# Patient Record
Sex: FEMALE | Race: WHITE | HISPANIC OR LATINO | URBAN - METROPOLITAN AREA
[De-identification: names, ages, dates, MRNs, and addresses within clinical notes are randomized per-mention and may not be internally consistent; named-entity substitution may affect disease eponyms.]

---

## 2019-09-20 ENCOUNTER — APPOINTMENT (RX ONLY)
Dept: URBAN - METROPOLITAN AREA CLINIC 15 | Facility: CLINIC | Age: 39
Setting detail: DERMATOLOGY
End: 2019-09-20

## 2019-09-20 DIAGNOSIS — Z41.9 ENCOUNTER FOR PROCEDURE FOR PURPOSES OTHER THAN REMEDYING HEALTH STATE, UNSPECIFIED: ICD-10-CM

## 2019-09-20 PROCEDURE — ? RESTYLANE LYFT INJECTION

## 2019-09-20 PROCEDURE — ? BOTOX

## 2019-09-20 PROCEDURE — ? RESTYLANE REFYNE INJECTION

## 2019-09-20 PROCEDURE — ? MEDICAL CONSULTATION: DYNAMIC RHYTIDES

## 2019-09-20 PROCEDURE — ? MEDICAL CONSULTATION: FILLERS

## 2019-09-20 PROCEDURE — ? RESTYLANE DEFYNE INJECTION

## 2019-09-20 ASSESSMENT — LOCATION DETAILED DESCRIPTION DERM
LOCATION DETAILED: GLABELLA
LOCATION DETAILED: RIGHT SUPERIOR LATERAL MALAR CHEEK
LOCATION DETAILED: LEFT INFERIOR CENTRAL MALAR CHEEK
LOCATION DETAILED: LEFT CENTRAL BUCCAL CHEEK
LOCATION DETAILED: RIGHT MEDIAL FOREHEAD
LOCATION DETAILED: RIGHT INFERIOR CENTRAL MALAR CHEEK
LOCATION DETAILED: LEFT INFERIOR VERMILION LIP
LOCATION DETAILED: RIGHT CENTRAL MALAR CHEEK
LOCATION DETAILED: RIGHT CHIN
LOCATION DETAILED: LEFT SUPERIOR CENTRAL MALAR CHEEK
LOCATION DETAILED: RIGHT CENTRAL BUCCAL CHEEK

## 2019-09-20 ASSESSMENT — LOCATION SIMPLE DESCRIPTION DERM
LOCATION SIMPLE: CHIN
LOCATION SIMPLE: GLABELLA
LOCATION SIMPLE: RIGHT FOREHEAD
LOCATION SIMPLE: LEFT LIP
LOCATION SIMPLE: LEFT CHEEK
LOCATION SIMPLE: RIGHT CHEEK

## 2019-09-20 ASSESSMENT — LOCATION ZONE DERM
LOCATION ZONE: FACE
LOCATION ZONE: LIP

## 2019-09-20 NOTE — PROCEDURE: RESTYLANE DEFYNE INJECTION
Include Cannula Information In Note?: No
Jawline Filler Volume In Cc: 0
Detail Level: Detailed
Expiration Date (Month Year): 2020-11-30
Consent: Written consent obtained. Risks include but not limited to bruising, beading, irregular texture, ulceration, infection, allergic reaction, scar formation, incomplete augmentation, temporary nature, procedural pain.
Anesthesia Volume In Cc: 0.5
Filler: Restylane Defyne
Additional Area 1 Location: lips
Post-Care Instructions: Patient instructed to apply ice to reduce swelling.
Anesthesia Type: 1% lidocaine with epinephrine
Map Statement: See [address] for Complete Details
Cheeks Filler Volume In Cc: 2
Procedural Text: The filler was administered to the treatment areas noted above.
Lot #: [id number]
Additional Anesthesia Volume In Cc: 6

## 2019-09-20 NOTE — PROCEDURE: RESTYLANE LYFT INJECTION
Marionette Lines Filler  Volume In Cc: 0
Lot #: [id number]
Use Map Statement For Sites (Optional): No
Additional Area 1 Volume In Cc: 1
Additional Anesthesia Volume In Cc: 6
Procedural Text: The filler was administered to the treatment areas noted above.
Additional Area 2 Location: nose
Consent: Written consent obtained. Risks include but not limited to bruising, beading, irregular texture, ulceration, infection, allergic reaction, scar formation, incomplete augmentation, temporary nature, procedural pain.
Cheeks Filler Volume In Cc: 2
Anesthesia Volume In Cc: 0.5
Post-Care Instructions: Patient instructed to apply ice to reduce swelling.
Expiration Date (Month Year): 2022-02-28
Map Statement: See [address] for Complete Details
Detail Level: Detailed
Filler: Govind Sow
Additional Area 1 Location: Chin Augmentation
Anesthesia Type: 1% lidocaine with epinephrine

## 2019-09-20 NOTE — PROCEDURE: RESTYLANE REFYNE INJECTION
Detail Level: Detailed
Use Map Statement For Sites (Optional): No
Map Statement: See [address] for Complete Details
Anesthesia Type: 1% lidocaine with epinephrine
Anesthesia Volume In Cc: 0.5
Additional Anesthesia Volume In Cc: 6
Filler: Restylane Refyne
Lateral Face Filler  Volume In Cc: 0
Additional Area 1 Location: lips
Additional Area 1 Volume In Cc: 1
Lot #: [id number]
Expiration Date (Month Year): 2020-08-31
Procedural Text: The filler was administered to the treatment areas noted above.
Consent: Written consent obtained. Risks include but not limited to bruising, beading, irregular texture, ulceration, infection, allergic reaction, scar formation, incomplete augmentation, temporary nature, procedural pain.
Post-Care Instructions: Patient instructed to apply ice to reduce swelling.

## 2019-09-20 NOTE — PROCEDURE: BOTOX
Additional Area 4 Units: 0
Lot #: [id number]
Expiration Date (Month Year): 02/2022
Consent: Written consent obtained. Risks include but not limited to lid/brow ptosis, bruising, swelling, diplopia, temporary effect, incomplete chemical denervation.
Post-Care Instructions: Patient instructed to not lie down for 4 hours and limit physical activity for 24 hours. Patient instructed not to travel by airplane for 48 hours.
Periorbital Skin Units: 12
Dilution (U/0.1 Cc): 1
Additional Area 1 Location: [facility]
Glabellar Complex Units: [address] [facility]
Detail Level: Detailed
Forehead Units: 10

## 2019-10-11 ENCOUNTER — APPOINTMENT (RX ONLY)
Dept: URBAN - METROPOLITAN AREA CLINIC 15 | Facility: CLINIC | Age: 39
Setting detail: DERMATOLOGY
End: 2019-10-11

## 2019-10-11 DIAGNOSIS — L81.1 CHLOASMA: ICD-10-CM

## 2019-10-11 DIAGNOSIS — L71.8 OTHER ROSACEA: ICD-10-CM

## 2019-10-11 DIAGNOSIS — Z41.9 ENCOUNTER FOR PROCEDURE FOR PURPOSES OTHER THAN REMEDYING HEALTH STATE, UNSPECIFIED: ICD-10-CM

## 2019-10-11 PROCEDURE — ? BOTOX

## 2019-10-11 PROCEDURE — ? COUNSELING

## 2019-10-11 PROCEDURE — ? ADDITIONAL NOTES

## 2019-10-11 PROCEDURE — ? TREATMENT REGIMEN

## 2019-10-11 PROCEDURE — 99213 OFFICE O/P EST LOW 20 MIN: CPT | Mod: NC

## 2019-10-11 ASSESSMENT — LOCATION SIMPLE DESCRIPTION DERM: LOCATION SIMPLE: LEFT EYEBROW

## 2019-10-11 ASSESSMENT — LOCATION ZONE DERM: LOCATION ZONE: FACE

## 2019-10-11 ASSESSMENT — LOCATION DETAILED DESCRIPTION DERM
LOCATION DETAILED: LEFT CENTRAL EYEBROW
LOCATION DETAILED: LEFT LATERAL EYEBROW

## 2019-10-11 NOTE — PROCEDURE: TREATMENT REGIMEN
Detail Level: Zone
Initiate Treatment: Wash face with gentle cleanser in the morning \\nFinacea foam thin layer to face\\nMetacel renew b3 in the morning \\nStrict sunscreen in the morning ( Alastin)\\n\\nWash face with gentle cleanser at night \\nMetacel renew b3 at night \\nAlphaRet thin layer to face at night ( Avoid corners of the mouth, nose and eyes)\\nSoolantra cream thin layer at night Samples given
Continue Regimen: Wash face with gentle cleanser in the morning \\nFinacea foam thin layer to face\\nBeta self renew b3 in the morning \\nStrict sunscreen in the morning ( Alastin)\\n\\nWash face with gentle cleanser at night \\nBeta self renew b3 at night \\nAlphaRet thin layer to face at night ( Avoid corners of the mouth, nose and eyes)\\nSoolantra cream thin layer at night Samples given

## 2019-10-11 NOTE — PROCEDURE: BOTOX
Left Periorbital Units: 0
Lot #: [id number]
Show Additional Area 1: Yes
Consent: Written consent obtained. Risks include but not limited to lid/brow ptosis, bruising, swelling, diplopia, temporary effect, incomplete chemical denervation.
Show Lcl Units: No
Detail Level: Detailed
Expiration Date (Month Year): 02/2022
Additional Area 3 Location: Superior lateral orbicularis oculi
Dilution (U/0.1 Cc): 1
Additional Area 2 Location: nose 2 units
Additional Area 3 Units: 2
Post-Care Instructions: Patient instructed to not lie down for 4 hours and limit physical activity for 24 hours. Patient instructed not to travel by airplane for 48 hours.
Additional Area 1 Location: [facility]

## 2019-10-11 NOTE — HPI: SKIN LESION
How Severe Is Your Skin Lesion?: mild
Has Your Skin Lesion Been Treated?: not been treated
Is This A New Presentation, Or A Follow-Up?: Yumiko Levy

## 2020-06-18 ENCOUNTER — APPOINTMENT (RX ONLY)
Dept: URBAN - METROPOLITAN AREA CLINIC 15 | Facility: CLINIC | Age: 40
Setting detail: DERMATOLOGY
End: 2020-06-18

## 2020-06-18 DIAGNOSIS — L81.0 POSTINFLAMMATORY HYPERPIGMENTATION: ICD-10-CM

## 2020-06-18 PROBLEM — Z92.89 PERSONAL HISTORY OF OTHER MEDICAL TREATMENT: Status: ACTIVE | Noted: 2020-06-18

## 2020-06-18 PROCEDURE — ? ADDITIONAL NOTES

## 2020-06-18 PROCEDURE — 99213 OFFICE O/P EST LOW 20 MIN: CPT | Mod: GT

## 2020-06-18 PROCEDURE — ? TELEHEALTH ASSESSMENT

## 2020-06-18 PROCEDURE — ? COUNSELING

## 2020-06-18 NOTE — PROCEDURE: TELEHEALTH ASSESSMENT

## 2020-06-18 NOTE — PROCEDURE: ADDITIONAL NOTES
Detail Level: Zone
Additional Notes: .\\n\\nAM\\n\\n- Cleanse face \\n\\n- Skin Better Even tone ( in office product) \\n\\n- Skin Medica TNS Serum + (purchased online)\\n\\n- SPF \\n\\nPM\\n\\n- cleanse face \\n\\n- Skin Better Even tone ( in office product) \\n\\n- Skin Better Intensive Alpha ret ( in office product) - advised to discontinue if she becomes pregnant\\n\\n* patient prefers to d/c HQ 4%

## 2020-09-29 ENCOUNTER — APPOINTMENT (RX ONLY)
Dept: URBAN - METROPOLITAN AREA CLINIC 15 | Facility: CLINIC | Age: 40
Setting detail: DERMATOLOGY
End: 2020-09-29

## 2020-09-29 DIAGNOSIS — Z41.9 ENCOUNTER FOR PROCEDURE FOR PURPOSES OTHER THAN REMEDYING HEALTH STATE, UNSPECIFIED: ICD-10-CM

## 2020-09-29 PROCEDURE — ? BOTOX

## 2020-09-29 PROCEDURE — ? FILLERS

## 2020-09-29 NOTE — PROCEDURE: BOTOX
Mentalis Units: 0
Show Additional Area 4: Yes
Expiration Date (Month Year): 04/23
Additional Area 3 Location: chin
Detail Level: Zone
Show Right And Left Brow Units: No
Post-Care Instructions: Patient instructed to not lie down for 4 hours and limit physical activity for 24 hours. Patient instructed not to travel by airplane for 48 hours.
Additional Area 2 Location: under eyes
Dilution (U/0.1 Cc): 1
Forehead Units: 15
Price (Use Numbers Only, No Special Characters Or $): [address] [facility]
Additional Area 5 Location: lips
Additional Area 1 Location: tail of eyebrow
Lot #: [id number]
Glabellar Complex Units: [address] [facility]
Additional Area 4 Location: neck
Consent: Written consent obtained. Risks include but not limited to lid/brow ptosis, bruising, swelling, diplopia, temporary effect, incomplete chemical denervation.

## 2020-09-29 NOTE — PROCEDURE: FILLERS
Additional Area 3 Volume In Cc: 0
Map Statment: See [address] for Complete Details
Additional Area 4 Location: neck lines
Cheeks Filler Volume In Cc: 1.5
Filler Comments: Hyper diluted Radiesse 1:1
Include Cannula Information In Note?: No
Additional Area 5 Location: nose
Lot #: [id number]
Anesthesia Type: 1% lidocaine with epinephrine
Lot #: [id number]
Expiration Date (Month Year): 2021-03-21
Expiration Date (Month Year): 12/10/20
Anesthesia Volume In Cc: 0.5
Detail Level: Detailed
Include Cannula Information In Note?: Yes
Lot #: [id number]
Include Cannula Brand?: DermaSculpt
Additional Anesthesia Volume In Cc: 6
Additional Area 1 Location: GL
Additional Area 1 Location: lips
Include Cannula Size?: 25G
Price (Use Numbers Only, No Special Characters Or $): Rogerio 354
Additional Area 2 Location: glabella
Include Cannula Length?: 1 inch
Include Cannula Length?: 1.5 inch
Consent: Written consent obtained. Risks include but not limited to bruising, beading, irregular texture, ulceration, infection, allergic reaction, scar formation, incomplete augmentation, temporary nature, procedural pain.
Post-Care Instructions: Patient instructed to apply ice to reduce swelling.
Additional Area 3 Location: chin
Filler: Radiesse
Price (Use Numbers Only, No Special Characters Or $): 478
Map Statment: See Attach Map for Complete Details
Lot #: [id number]
Lot #: [id number]
Lot #: [id number]
Lot #: [id number]
Lot #: [id number]
Lot #: [id number]
Price (Use Numbers Only, No Special Characters Or $): Nagi
Filler: Restylane Defyne
Filler: Restylane Kysse
Price (Use Numbers Only, No Special Characters Or $): 791
Lot #: [id number]
Vermilion Lips Filler Volume In Cc: 1
Lot #: [id number]
Lot #: [id number]

## 2020-10-15 ENCOUNTER — APPOINTMENT (RX ONLY)
Dept: URBAN - METROPOLITAN AREA CLINIC 15 | Facility: CLINIC | Age: 40
Setting detail: DERMATOLOGY
End: 2020-10-15

## 2020-10-15 DIAGNOSIS — Z41.9 ENCOUNTER FOR PROCEDURE FOR PURPOSES OTHER THAN REMEDYING HEALTH STATE, UNSPECIFIED: ICD-10-CM

## 2020-10-15 PROCEDURE — ? RADIESSE + INJECTION

## 2020-10-15 PROCEDURE — ? RECOMMENDATIONS

## 2020-10-15 PROCEDURE — ? BOTOX

## 2020-10-15 NOTE — PROCEDURE: RADIESSE + INJECTION
Post-Care Instructions: Patient instructed to apply ice to reduce swelling.
Lateral Face Filler  Volume In Cc: 0
Expiration Date (Month Year): 8/28/22
Additional Anesthesia Volume In Cc: 6
Number Of Syringes (Required For Inventory): 1
Price (Use Numbers Only, No Special Characters Or $): Nagi
Anesthesia Volume In Cc: 0.5
Map Statement: See [address] for Complete Details
Detail Level: Detailed
Lot #: [id number]
Procedural Text: The filler was administered to the treatment areas noted above.
Filler: Radiesse
Consent: Written consent obtained. Risks include but not limited to bruising, beading, irregular texture, ulceration, infection, allergic reaction, scar formation, incomplete augmentation, temporary nature, procedural pain.
Use Map Statement For Sites (Optional): No
Anesthesia Type: 1% lidocaine with epinephrine
Price (Use Numbers Only, No Special Characters Or $): 523
Lot #: [id number]
Map Statement: See Attach Map for Complete Details

## 2020-10-15 NOTE — PROCEDURE: BOTOX
Additional Area 1 Location: Rt forehead
Additional Area 3 Location: chin
Additional Area 6 Units: 0
Additional Area 1 Units: 2
Show Forehead Units: Yes
Show Ucl Units: No
Post-Care Instructions: Patient instructed to not lie down for 4 hours and limit physical activity for 24 hours. Patient instructed not to travel by airplane for 48 hours.
Lot #: [id number]
Expiration Date (Month Year): 11/22
Detail Level: Zone
Additional Area 2 Location: under eyes
Consent: Written consent obtained. Risks include but not limited to lid/brow ptosis, bruising, swelling, diplopia, temporary effect, incomplete chemical denervation.
Additional Area 4 Location: neck
Additional Area 5 Location: lips

## 2020-10-15 NOTE — PROCEDURE: RECOMMENDATIONS
Recommendation Preamble: Recommendations made during todayâs visit
Recommendations (Free Text): IPL for redness and brown spots on the face. 3-4 txs. $400 per treatment \\nRadiesse 1.5cc x 2 syringes. Origami price $800 per syringe.  $750 per syringe ok per Dr Zahra Alva
Detail Level: Zone

## 2020-12-11 ENCOUNTER — APPOINTMENT (RX ONLY)
Dept: URBAN - METROPOLITAN AREA CLINIC 15 | Facility: CLINIC | Age: 40
Setting detail: DERMATOLOGY
End: 2020-12-11

## 2020-12-11 DIAGNOSIS — Z41.9 ENCOUNTER FOR PROCEDURE FOR PURPOSES OTHER THAN REMEDYING HEALTH STATE, UNSPECIFIED: ICD-10-CM

## 2020-12-11 PROCEDURE — ? PICOWAY LASER

## 2020-12-11 NOTE — PROCEDURE: PICOWAY LASER
Handpiece: n/a nm
Treatment Number: 0
Fluence (J/Cm2): 2
Spot Size: 6.0 mm
Detail Level: Detailed
Post-Care Instructions: I reviewed with the patient in detail post-care instructions. Patient should avoid sun for a minimum of 4 weeks before and after treatment.
Fluence (J/Cm2): 1.6
Handpiece: Zoom
Pulse Duration: 2.5 ms
Pre-Procedure: Prior to proceeding the treatment areas were cleaned and all present put on their eye protection.
Hide Pulse Duration?: No
Post-Procedure Care: Immediate endpoint: perifollicular erythema and edema. Vaseline and ice applied. Post care reviewed with patient.
Wavelength: 532 nm
Wavelength: 1064 nm
Spot Size: 2.0 mm
Topical Anesthesia Type: 20% benzocaine, 8% lidocaine, 4% tetracaine
Anesthesia Type: 1% lidocaine with epinephrine
Pulse Duration: 5 ms
Handpiece: Resolve
Fluence (J/Cm2): 0.6
Spot Size: 5.0 mm
Treatment Number: 1
Consent: Written consent obtained, risks reviewed including but not limited to crusting, scabbing, blistering, scarring, darker or lighter pigmentary change, paradoxical hair regrowth, incomplete removal of hair and infection.
Price (Use Numbers Only, No Special Characters Or $): Rogerio 354

## 2020-12-11 NOTE — HPI: COSMETIC (LASER RESURFACING)
Eye Color: dark Liza Ryan
Have You Had Laser Resurfacing Before?: has not had previous treatments
Additional History: Patient part of [facility] event

## 2021-05-21 ENCOUNTER — APPOINTMENT (RX ONLY)
Dept: URBAN - METROPOLITAN AREA CLINIC 15 | Facility: CLINIC | Age: 41
Setting detail: DERMATOLOGY
End: 2021-05-21

## 2021-05-21 DIAGNOSIS — Z41.9 ENCOUNTER FOR PROCEDURE FOR PURPOSES OTHER THAN REMEDYING HEALTH STATE, UNSPECIFIED: ICD-10-CM

## 2021-05-21 PROCEDURE — ? FILLERS

## 2021-05-21 PROCEDURE — ? COSMETIC CONSULTATION: FILLERS

## 2021-05-21 NOTE — PROCEDURE: FILLERS
Additional Area 3 Location: chin
Include Cannula Information In Note?: No
[place] Filler  Volume In Cc: 0
Include Cannula Brand?: DermaSculpt
Detail Level: Detailed
Additional Area 1 Location: lips
Include Cannula Size?: 25G
Cheeks Filler Volume In Cc: 1.5
Additional Area 4 Location: neck lines
Consent: Written consent obtained. Risks include but not limited to bruising, beading, irregular texture, ulceration, infection, allergic reaction, scar formation, incomplete augmentation, temporary nature, procedural pain.
Price (Use Numbers Only, No Special Characters Or $): Rogerio 354
Include Cannula Length?: 1 inch
Additional Area 5 Location: fines lines
Post-Care Instructions: Patient instructed to apply ice to reduce swelling.
Anesthesia Type: 1% lidocaine with epinephrine
Lot #: [id number]
Map Statment: See [address] for Complete Details
Anesthesia Volume In Cc: 0.5
Expiration Date (Month Year): 03-17-22
Lot #: [id number]
Lot #: [id number]
Additional Anesthesia Volume In Cc: 6
Additional Area 1 Location: perioral lines
Expiration Date (Month Year): 2021-03-21
Additional Area 2 Location: buttock
Include Cannula Information In Note?: Yes
Include Cannula Length?: 1.5 inch
Filler: Radiesse
Filler: Restylane Kysse
Map Statment: See Attach Map for Complete Details
Vermilion Lips Filler Volume In Cc: 1
Lot #: [id number]
Lot #: [id number]
Lot #: [id number]
Price (Use Numbers Only, No Special Characters Or $): [address] [facility]
Price (Use Numbers Only, No Special Characters Or $): 492
Filler: RHA 2
Filler Comments: Diluted with lido 0.5cc
Lot #: [id number]
Lot #: [id number]
Lot #: [id number]
Lot #: [id number]
Lot #: [id number]
Filler Comments: Hyper diluted (1:1) to the jawline and MLF
Lot #: [id number]